# Patient Record
(demographics unavailable — no encounter records)

---

## 2024-12-04 NOTE — HISTORY OF PRESENT ILLNESS
[Disease: _____________________] : Disease: [unfilled] [AJCC Stage: ____] : AJCC Stage: [unfilled] [de-identified] : Ms. Luong has EMC and is status post robotic TLH/BSO/staging on 10/28/22 . 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 Final Diagnosis: 1-Right pelvic sentinel lymph node, excision - Two benign lymph nodes (0/2). 2.Enlarged right obturator sentinel node, excision - Metastatic carcinoma in one out of one lymph node (1/1). - Carcinoma size: 1.4 cm. - Negative for extracapsular extension. 3.Left tube and ovary, salpingo-oophorectomy: - Benign fimbriated fallopian tube. - Benign ovary. 4.Left pelvic sentinel node, excision - Seven benign lymph nodes (0/7). 5.Uterus, cervix right tube and ovary, hysterectomy and salpingo- oophorectomy: - Endometrial endometrioid carcinoma FIGO grade 2 - Lymphovascular invasion present. - Carcinoma invading cervix. - Carcinoma present at inked cervical resection margin. - Benign fimbriated fallopian tube. - Benign ovary. Comment: The specimen was received in multiple pieces. Accurate orientation cannot be determined. The tumor diffusely infiltrates myometrium, lower uterine segment, and cervical stroma. The tumor cells are positive for Vimentin and ER; patchy positive for CEA and p16. The immunoprofile supports endometrial endometrioid carcinioma. The case was reviewed in departmental consensus meeting on 11/9/2022.  MLH1 Intact nuclear expression MSH2 Intact nuclear expression MSH6 Intact nuclear expression PMS2 Intact nuclear expression IHC Interpretation for Mismatch Repair (MMR) Proteins: No loss of nuclear expression of MMR proteins: low probability of microsatellite instability-high (MSI-H).  11/7/23: CT A/P - JOHN 12/2023- - 11, CEA- <0.6  She completed Chemo/EBRT/VBT 6/2023. [de-identified] : [facility] [de-identified] : Brooke is here for a follow up. She feels well, no new complaints. She is up to date with mammogram, colonoscopy.

## 2024-12-19 NOTE — HISTORY OF PRESENT ILLNESS
[FreeTextEntry1] : Ms. Luong has EMC and is status post robotic TLH/BSO/staging on 10/28/22 . 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  Final Diagnosis: 1-Right pelvic sentinel lymph node, excision - Two benign lymph nodes (0/2). 2.Enlarged right obturator sentinel node, excision - Metastatic  carcinoma in one out of one lymph node (1/1). - Carcinoma size: 1.4 cm. - Negative for extracapsular extension. 3.Left tube and ovary, salpingo-oophorectomy: - Benign fimbriated fallopian tube. - Benign ovary. 4.Left pelvic sentinel node, excision - Seven benign lymph nodes (0/7). 5.Uterus, cervix right tube and ovary, hysterectomy and salpingo- oophorectomy: - Endometrial endometrioid carcinoma FIGO grade 2 - Lymphovascular invasion present. - Carcinoma invading cervix. - Carcinoma present at inked cervical resection margin. - Benign fimbriated fallopian tube. - Benign ovary. Comment: The specimen was received in multiple pieces. Accurate orientation cannot be determined. The tumor diffusely infiltrates myometrium, lower uterine segment, and cervical stroma. The tumor cells are positive for Vimentin and ER; patchy positive for CEA and p16. The immunoprofile supports endometrial endometrioid carcinioma. The case was reviewed in departmental consensus meeting on    11/9/2022.  MLH1 Intact nuclear expression MSH2 Intact nuclear expression MSH6 Intact nuclear expression PMS2 Intact nuclear expression IHC Interpretation for Mismatch Repair (MMR) Proteins: No loss of nuclear expression of MMR proteins: low probability of microsatellite instability-high (MSI-H).  11/7/23: CT A/P - JOHN 12/2023- - 11, CEA- <0.6 3/2024: CT C/A/P: JOHN 6/2024:  - 9, CEA - 0.7 6/17/24: CT C/A/P: JOHN 10/2024 CT C/A/P: JOHN 12/2024  = 10, CEA = 0.7  She completed Chemo/EBRT/VBT 6/2023.  She denies any VB, pelvic pain or any other associated signs or symptoms.   Health Maintenance: BMI: 41 COVID vaccine received: yes + two boosters Mammogram: 11/8/24 BIRADS2 Colonoscopy: 6/2024 - f/u 5 yrs, Dr. Monroe  PCP: Dr. Rebecca Pelayo GYN/Ref: Tamara Muñoz MD; no longer sees Med Onc: Dr. Santos Rad Onc: Dr. Rebolledo

## 2024-12-19 NOTE — PHYSICAL EXAM
[Chaperone Present] : A chaperone was present in the examining room during all aspects of the physical examination [19601] : A chaperone was present during the pelvic exam. [FreeTextEntry2] : Yuni [Absent] : Adnexa(ae): Absent [Normal] : Recto-Vaginal Exam: Normal [de-identified] : well-healed [de-identified] : well-healed, no lesions [de-identified] : cuff well-healed, mobile

## 2024-12-19 NOTE — ASSESSMENT
[FreeTextEntry1] : 70y/o with stage IIIC2 FIGO grade 2 endometrial cancer, completed Chemo/RT, clinically without evidence of disease.

## 2024-12-19 NOTE — DISCUSSION/SUMMARY
[Reviewed Clinical Lab Test(s)] : Results of clinical tests were reviewed. [FreeTextEntry1] : - interval results reviewed.  - The risk of recurrence, signs and symptoms and surveillance plan were reviewed in detail.  -  reviewed.  - She was advised to see me in 6 months, alternating q3m with Dr. Rebolledo.  - All questions were answered to her apparent satisfaction.

## 2025-02-13 NOTE — REVIEW OF SYSTEMS
[Constipation: Grade 1 - Occasional or intermittent symptoms; occasional use of stool softeners, laxatives, dietary modification, or enema] : Constipation: Grade 1 - Occasional or intermittent symptoms; occasional use of stool softeners, laxatives, dietary modification, or enema [Urinary Incontinence: Grade 1 - Occasional (e.g., with coughing, sneezing, etc.), pads not indicated] : Urinary Incontinence: Grade 1 - Occasional (e.g., with coughing, sneezing, etc.), pads not indicated

## 2025-02-19 NOTE — HISTORY OF PRESENT ILLNESS
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 LVI + One pelvic lymph node involved.  She started chemotherapy in December 2022. She completed radiation therapy to the pelvis to a total dose of 4500 cGy, this was followed by vaginal brachytherapy. Completed treatment in June, 2023. She tolerated treatment well without developing any grade 3 or higher acute toxicity due to her treatment.  11/7/23 bilateral screening mammogram: There is a nodular focal asymmetry in the upper outer right breast. No suspicious mass, suspicious microcalcifications, or other sign of malignancy is identified left breast.  There are few bilateral scattered benign type calcifications. Right breast focal asymmetry. The patient will be recalled for further evaluation with targeted ultrasound of the right breast.  On 11/16/2023, she had a f/u right breast US which showed no sonographic evidence of malignancy, right breast.  6/17/24 CT C/A/P: Stable exam. No evidence of metastatic disease in the thorax, abdomen or pelvis.  8/7/24: Presents for routine follow-up. Continues following with Myrtle Quiñones and Tom. Recent  WNL and imaging stable. She is feeling well, no abdominal or pelvic pain, no vaginal bleeding, no GI/ issues.  Recent colonoscopy with Dr. Triplett.   1/28/25 CT CAP JOHN  2/13/25: Returns for a routine follow up. Continues following with Myrtle Santos and Nuria. Recent CEA,  WNL and CT JOHN. She is feeling well, with no abdominal or pelvic pain, no vaginal bleeding, occasional constipation, and mild stress incontinence. Using vaginal dilators regularly. She reports feeling some anxiety related to her diagnosis and also friends/family who have recently become ill or passed away. She speaks to a therapist, but would like to consider a support group. Referred to social work for further discussion/information on support services.

## 2025-03-05 NOTE — HISTORY OF PRESENT ILLNESS
[Disease: _____________________] : Disease: [unfilled] [AJCC Stage: ____] : AJCC Stage: [unfilled] [de-identified] : Ms. Luong has EMC and is status post robotic TLH/BSO/staging on 10/28/22 . 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 Final Diagnosis: 1-Right pelvic sentinel lymph node, excision - Two benign lymph nodes (0/2). 2.Enlarged right obturator sentinel node, excision - Metastatic carcinoma in one out of one lymph node (1/1). - Carcinoma size: 1.4 cm. - Negative for extracapsular extension. 3.Left tube and ovary, salpingo-oophorectomy: - Benign fimbriated fallopian tube. - Benign ovary. 4.Left pelvic sentinel node, excision - Seven benign lymph nodes (0/7). 5.Uterus, cervix right tube and ovary, hysterectomy and salpingo- oophorectomy: - Endometrial endometrioid carcinoma FIGO grade 2 - Lymphovascular invasion present. - Carcinoma invading cervix. - Carcinoma present at inked cervical resection margin. - Benign fimbriated fallopian tube. - Benign ovary. Comment: The specimen was received in multiple pieces. Accurate orientation cannot be determined. The tumor diffusely infiltrates myometrium, lower uterine segment, and cervical stroma. The tumor cells are positive for Vimentin and ER; patchy positive for CEA and p16. The immunoprofile supports endometrial endometrioid carcinioma. The case was reviewed in departmental consensus meeting on 11/9/2022.  MLH1 Intact nuclear expression MSH2 Intact nuclear expression MSH6 Intact nuclear expression PMS2 Intact nuclear expression IHC Interpretation for Mismatch Repair (MMR) Proteins: No loss of nuclear expression of MMR proteins: low probability of microsatellite instability-high (MSI-H).  11/7/23: CT A/P - JOHN 12/2023- - 11, CEA- <0.6  She completed Chemo/EBRT/VBT 6/2023. [de-identified] : [facility] [de-identified] : Patient is here for follow up. She has been feeling well overall. She feels stronger and has more energy. Appetite is good. Denies abdominal pain, nausea, vomiting, diarrhea, constipation, bleeding. Up to date with mammo and colonoscopy.

## 2025-03-05 NOTE — HISTORY OF PRESENT ILLNESS
[Disease: _____________________] : Disease: [unfilled] [AJCC Stage: ____] : AJCC Stage: [unfilled] [de-identified] : Ms. Luong has EMC and is status post robotic TLH/BSO/staging on 10/28/22 . 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 Final Diagnosis: 1-Right pelvic sentinel lymph node, excision - Two benign lymph nodes (0/2). 2.Enlarged right obturator sentinel node, excision - Metastatic carcinoma in one out of one lymph node (1/1). - Carcinoma size: 1.4 cm. - Negative for extracapsular extension. 3.Left tube and ovary, salpingo-oophorectomy: - Benign fimbriated fallopian tube. - Benign ovary. 4.Left pelvic sentinel node, excision - Seven benign lymph nodes (0/7). 5.Uterus, cervix right tube and ovary, hysterectomy and salpingo- oophorectomy: - Endometrial endometrioid carcinoma FIGO grade 2 - Lymphovascular invasion present. - Carcinoma invading cervix. - Carcinoma present at inked cervical resection margin. - Benign fimbriated fallopian tube. - Benign ovary. Comment: The specimen was received in multiple pieces. Accurate orientation cannot be determined. The tumor diffusely infiltrates myometrium, lower uterine segment, and cervical stroma. The tumor cells are positive for Vimentin and ER; patchy positive for CEA and p16. The immunoprofile supports endometrial endometrioid carcinioma. The case was reviewed in departmental consensus meeting on 11/9/2022.  MLH1 Intact nuclear expression MSH2 Intact nuclear expression MSH6 Intact nuclear expression PMS2 Intact nuclear expression IHC Interpretation for Mismatch Repair (MMR) Proteins: No loss of nuclear expression of MMR proteins: low probability of microsatellite instability-high (MSI-H).  11/7/23: CT A/P - JOHN 12/2023- - 11, CEA- <0.6  She completed Chemo/EBRT/VBT 6/2023. [de-identified] : [facility] [de-identified] : Patient is here for follow up. She has been feeling well overall. She feels stronger and has more energy. Appetite is good. Denies abdominal pain, nausea, vomiting, diarrhea, constipation, bleeding. Up to date with mammo and colonoscopy.

## 2025-06-19 NOTE — PHYSICAL EXAM
[MA] : MA [Absent] : Adnexa(ae): Absent [Normal] : Recto-Vaginal Exam: Normal [FreeTextEntry2] : Yuni [de-identified] : well-healed [de-identified] : well-healed, no lesions [de-identified] : cuff well-healed, mobile

## 2025-06-19 NOTE — HISTORY OF PRESENT ILLNESS
[FreeTextEntry1] : Ms. Luong has EMC and is status post robotic TLH/BSO/staging on 10/28/22 . 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  Final Diagnosis: 1-Right pelvic sentinel lymph node, excision - Two benign lymph nodes (0/2). 2.Enlarged right obturator sentinel node, excision - Metastatic  carcinoma in one out of one lymph node (1/1). - Carcinoma size: 1.4 cm. - Negative for extracapsular extension. 3.Left tube and ovary, salpingo-oophorectomy: - Benign fimbriated fallopian tube. - Benign ovary. 4.Left pelvic sentinel node, excision - Seven benign lymph nodes (0/7). 5.Uterus, cervix right tube and ovary, hysterectomy and salpingo- oophorectomy: - Endometrial endometrioid carcinoma FIGO grade 2 - Lymphovascular invasion present. - Carcinoma invading cervix. - Carcinoma present at inked cervical resection margin. - Benign fimbriated fallopian tube. - Benign ovary. Comment: The specimen was received in multiple pieces. Accurate orientation cannot be determined. The tumor diffusely infiltrates myometrium, lower uterine segment, and cervical stroma. The tumor cells are positive for Vimentin and ER; patchy positive for CEA and p16. The immunoprofile supports endometrial endometrioid carcinioma. The case was reviewed in departmental consensus meeting on    11/9/2022.  MLH1 Intact nuclear expression MSH2 Intact nuclear expression MSH6 Intact nuclear expression PMS2 Intact nuclear expression IHC Interpretation for Mismatch Repair (MMR) Proteins: No loss of nuclear expression of MMR proteins: low probability of microsatellite instability-high (MSI-H).  11/7/23: CT A/P - JOHN 12/2023- - 11, CEA- <0.6 3/2024: CT C/A/P: JOHN 6/2024:  - 9, CEA - 0.7 6/17/24: CT C/A/P: JOHN 10/2024 CT C/A/P: JOHN 12/2024  = 10, CEA = 0.7 4/3/25 CT A/P:  JOHN 6/2025  = 10  She completed Chemo/EBRT/VBT 6/2023.  She denies any VB, pelvic pain or any other associated signs or symptoms.   Health Maintenance: BMI: 41 COVID vaccine received: yes + two boosters Mammogram: 11/8/24 BIRADS2 Colonoscopy: 6/2024 - f/u 5 yrs, Dr. Monroe  PCP: Dr. Rebecca Pelayo GYN/Ref: Tamara Muñoz MD; no longer sees Med Onc: Dr. Tom Antunez Onc: Dr. Rebolledo

## 2025-07-24 NOTE — HISTORY OF PRESENT ILLNESS
[FreeTextEntry1] : Here for follow up routine. No new complaints. No CP, SOB, palps or dizziness. Labs incl recent lipid profile reviewed.

## 2025-07-24 NOTE — DISCUSSION/SUMMARY
[FreeTextEntry1] : Contin statin. Advised home BP monitoring Advised diet, exercise and wt loss Follow up 3 mo  [EKG obtained to assist in diagnosis and management of assessed problem(s)] : EKG obtained to assist in diagnosis and management of assessed problem(s)